# Patient Record
(demographics unavailable — no encounter records)

---

## 2024-10-19 NOTE — HEALTH RISK ASSESSMENT
[Good] : ~his/her~  mood as  good [No] : In the past 12 months have you used drugs other than those required for medical reasons? No [0] : 2) Feeling down, depressed, or hopeless: Not at all (0) [PHQ-2 Negative - No further assessment needed] : PHQ-2 Negative - No further assessment needed [Never] : Never [Patient reported mammogram was normal] : Patient reported mammogram was normal [Patient reported PAP Smear was normal] : Patient reported PAP Smear was normal [[id number]] : 0 [Change in mental status noted] : No change in mental status noted [MammogramDate] : 04/24 [PapSmearDate] : 04/24

## 2024-10-19 NOTE — ASSESSMENT
[FreeTextEntry1] : HCM: Up to date on mammogram, pap smear as per patient. Discussed FIT for colon ca screening. Check labs.  OBesity: EKG shows sinus rhythm.

## 2025-03-13 NOTE — ASSESSMENT
[FreeTextEntry1] : 56F with microscopic hematuria.  Latest UA from 10/19/2024 shows 10 RBCs. History of kidney stones s/p LL >10 years ago.   -Previous labs reviewed -UA/Ucx today -OMER -KUB XR  -RTC in 1 month to discuss results      Daxa Freeman MD Chief of Urology, [address], [address] [place] [address] Phone: [phone] Fax: [phone]

## 2025-03-13 NOTE — HISTORY OF PRESENT ILLNESS
[None] : no symptoms [FreeTextEntry1] : Referring Provider: Dr. Goodman Chief Complaint: Blood in urine Date of first visit: 2025 Occupation: Director of    JOVITA JAIRON is a 56 year old  woman with a PMHx of kidney stones who presents for evaluation of blood in urine.  Was referred by her PCP for microscopic hematuria.  UA from 10/21/2024 shows 10 RBCs/HPF.  Denies history of gross hematuria.  States about 2 weeks ago, experienced non-radiating, persistent right flank pain that self resolved after 1 day.  History of kidney stone s/p laser lithotripsy over 10 years ago. Denies smoking history or secondhand smoke exposure.  States her mother was diagnosed with cancer, however does not believe it was  related.  Denies UTI history.  Reports occasional CARLOS A, reports it is not bothersome to her.  Denies vaginal dryness irritation or dyspareunia. She is perimenopausal, LMP in 2025.  She follows up with her GYN annually.  UA Hx 10/19/24  10 RBCs 21  4 RBCs   PMHx: Kidney stones SxHx: Laser lithotripsy >10 years ago,  FHx: Mother: ?Stomach cancer SocHx: Never smoker Allergies: NKDA   The patient denies fevers, chills, nausea and or vomiting and no unexplained weight loss. All pertinent laboratory, films and physician notes were reviewed.

## 2025-03-13 NOTE — ASSESSMENT
[FreeTextEntry1] : 56F with microscopic hematuria.  Latest UA from 10/19/2024 shows 10 RBCs. History of kidney stones s/p LL >10 years ago.   -Previous labs reviewed -UA/Ucx today -OMER -KUB XR  -RTC in 1 month to discuss results      Daxa Freeman MD Chief of Urology, [address], [address] [place] [address] Phone: [phone] Fax: [phone]

## 2025-03-13 NOTE — PHYSICAL EXAM
[Normal Appearance] : normal appearance [Well Groomed] : well groomed [General Appearance - In No Acute Distress] : no acute distress [Edema] : no peripheral edema [Respiration, Rhythm And Depth] : normal respiratory rhythm and effort [Exaggerated Use Of Accessory Muscles For Inspiration] : no accessory muscle use [Abdomen Soft] : soft [Abdomen Tenderness] : non-tender [Costovertebral Angle Tenderness] : no ~M costovertebral angle tenderness [Urinary Bladder Findings] : the bladder was normal on palpation [Normal Station and Gait] : the gait and station were normal for the patient's age [] : no rash [No Focal Deficits] : no focal deficits [Oriented To Time, Place, And Person] : oriented to person, place, and time [Affect] : the affect was normal [Mood] : the mood was normal [No Palpable Adenopathy] : no palpable adenopathy [Skin Color & Pigmentation] : normal skin color and pigmentation [Skin Turgor] : supple [Not Anxious] : not anxious [de-identified] : pt deferred

## 2025-03-13 NOTE — PHYSICAL EXAM
[Normal Appearance] : normal appearance [Well Groomed] : well groomed [General Appearance - In No Acute Distress] : no acute distress [Edema] : no peripheral edema [Respiration, Rhythm And Depth] : normal respiratory rhythm and effort [Exaggerated Use Of Accessory Muscles For Inspiration] : no accessory muscle use [Abdomen Soft] : soft [Abdomen Tenderness] : non-tender [Costovertebral Angle Tenderness] : no ~M costovertebral angle tenderness [Urinary Bladder Findings] : the bladder was normal on palpation [Normal Station and Gait] : the gait and station were normal for the patient's age [] : no rash [No Focal Deficits] : no focal deficits [Oriented To Time, Place, And Person] : oriented to person, place, and time [Affect] : the affect was normal [Mood] : the mood was normal [No Palpable Adenopathy] : no palpable adenopathy [Skin Color & Pigmentation] : normal skin color and pigmentation [Skin Turgor] : supple [Not Anxious] : not anxious [de-identified] : pt deferred

## 2025-04-03 NOTE — ASSESSMENT
[FreeTextEntry1] : 56F with history of microscopic hematuria. Latest UA from 3/13/25 shows 2 RBCs. History of kidney stones s/p LL >10 years ago.  -Previous labs reviewed -OMER and KUB XR imaging reviewed with patient -RTC in 6 months for repeat UA   Daxa Freeman MD Chief of Urology, [address], [address] [place] [address] Phone: [phone] Fax: [phone]

## 2025-04-03 NOTE — ASSESSMENT
[FreeTextEntry1] : 56F with history of microscopic hematuria. Latest UA from 3/13/25 shows 2 RBCs. History of kidney stones s/p LL >10 years ago.  -Previous labs reviewed -OMER and KUB XR imaging reviewed with patient -RTC in 6 months for repeat UA   Daxa Freeman MD Chief of Urology, [address], [address] [place] [address] Phone: [phone] Fax: [phone]

## 2025-04-03 NOTE — PHYSICAL EXAM
[Normal Appearance] : normal appearance [Well Groomed] : well groomed [General Appearance - In No Acute Distress] : no acute distress [Edema] : no peripheral edema [Respiration, Rhythm And Depth] : normal respiratory rhythm and effort [Exaggerated Use Of Accessory Muscles For Inspiration] : no accessory muscle use [Urinary Bladder Findings] : the bladder was normal on palpation [Normal Station and Gait] : the gait and station were normal for the patient's age [] : no rash [No Focal Deficits] : no focal deficits [Oriented To Time, Place, And Person] : oriented to person, place, and time [Affect] : the affect was normal [Mood] : the mood was normal [de-identified] : Nondistended

## 2025-04-03 NOTE — PHYSICAL EXAM
[Normal Appearance] : normal appearance [Well Groomed] : well groomed [General Appearance - In No Acute Distress] : no acute distress [Edema] : no peripheral edema [Respiration, Rhythm And Depth] : normal respiratory rhythm and effort [Exaggerated Use Of Accessory Muscles For Inspiration] : no accessory muscle use [Urinary Bladder Findings] : the bladder was normal on palpation [Normal Station and Gait] : the gait and station were normal for the patient's age [] : no rash [No Focal Deficits] : no focal deficits [Oriented To Time, Place, And Person] : oriented to person, place, and time [Affect] : the affect was normal [Mood] : the mood was normal [de-identified] : Nondistended

## 2025-04-03 NOTE — HISTORY OF PRESENT ILLNESS
[None] : None [FreeTextEntry1] : Referring Provider: Dr. 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 Was referred by her PCP for microscopic hematuria. UA from 10/21/2024 shows 10 RBCs/HPF. Denies history of gross hematuria. States about 2 weeks ago, experienced non-radiating, persistent right flank pain that self resolved after 1 day. History of kidney stone s/p laser lithotripsy over 10 years ago. Denies smoking history or secondhand smoke exposure. States her mother was diagnosed with cancer, however does not believe it was  related. Denies UTI history. Reports occasional CARLOS A, reports it is not bothersome to her. Denies vaginal dryness irritation or dyspareunia. She is perimenopausal, LMP in 2025. She follows up with her GYN annually.  25: Presents today for follow-up for microscopic hematuria.  Renal bladder ultrasound and KUB x-ray negative.  Endorses right-sided flank pain has self-resolved. Repeat UA 3/13/25 showed 2 RBCs. Denies gross hematuria. She is overall feeling well.   UA Hx 3/12/25  2 RBCs 10/19/24 10 RBCs 21 4 RBCs  PMHx: Kidney stones SxHx: Laser lithotripsy >10 years ago,  FHx: Mother: ?Stomach cancer SocHx: Never smoker Allergies: NKDA  The patient denies fevers, chills, nausea and or vomiting and no unexplained weight loss. All pertinent laboratory, films and physician notes were reviewed.ms.